# Patient Record
Sex: FEMALE | Race: WHITE | NOT HISPANIC OR LATINO | Employment: FULL TIME | URBAN - METROPOLITAN AREA
[De-identification: names, ages, dates, MRNs, and addresses within clinical notes are randomized per-mention and may not be internally consistent; named-entity substitution may affect disease eponyms.]

---

## 2018-05-30 ENCOUNTER — OFFICE VISIT (OUTPATIENT)
Dept: INTERNAL MEDICINE | Facility: CLINIC | Age: 43
End: 2018-05-30

## 2018-05-30 VITALS
OXYGEN SATURATION: 100 % | RESPIRATION RATE: 16 BRPM | WEIGHT: 158 LBS | HEART RATE: 68 BPM | DIASTOLIC BLOOD PRESSURE: 94 MMHG | BODY MASS INDEX: 28 KG/M2 | HEIGHT: 63 IN | TEMPERATURE: 97 F | SYSTOLIC BLOOD PRESSURE: 142 MMHG

## 2018-05-30 DIAGNOSIS — N95.1 PERIMENOPAUSAL SYMPTOMS: ICD-10-CM

## 2018-05-30 DIAGNOSIS — I10 ESSENTIAL HYPERTENSION: Primary | ICD-10-CM

## 2018-05-30 DIAGNOSIS — Z86.2 HISTORY OF ANEMIA: ICD-10-CM

## 2018-05-30 DIAGNOSIS — R53.83 FATIGUE, UNSPECIFIED TYPE: ICD-10-CM

## 2018-05-30 DIAGNOSIS — Z12.31 ENCOUNTER FOR SCREENING MAMMOGRAM FOR BREAST CANCER: ICD-10-CM

## 2018-05-30 LAB
ALBUMIN SERPL-MCNC: 4.6 G/DL (ref 3.2–4.8)
ALBUMIN/GLOB SERPL: 1.6 G/DL (ref 1.5–2.5)
ALP SERPL-CCNC: 138 U/L (ref 25–100)
ALT SERPL W P-5'-P-CCNC: 10 U/L (ref 7–40)
ANION GAP SERPL CALCULATED.3IONS-SCNC: 4 MMOL/L (ref 3–11)
AST SERPL-CCNC: 21 U/L (ref 0–33)
BASOPHILS # BLD AUTO: 0.06 10*3/MM3 (ref 0–0.2)
BASOPHILS NFR BLD AUTO: 0.8 % (ref 0–1)
BILIRUB SERPL-MCNC: 0.4 MG/DL (ref 0.3–1.2)
BUN BLD-MCNC: 12 MG/DL (ref 9–23)
BUN/CREAT SERPL: 15 (ref 7–25)
CALCIUM SPEC-SCNC: 9.1 MG/DL (ref 8.7–10.4)
CHLORIDE SERPL-SCNC: 106 MMOL/L (ref 99–109)
CO2 SERPL-SCNC: 29 MMOL/L (ref 20–31)
CREAT BLD-MCNC: 0.8 MG/DL (ref 0.6–1.3)
DEPRECATED RDW RBC AUTO: 47.8 FL (ref 37–54)
EOSINOPHIL # BLD AUTO: 0.12 10*3/MM3 (ref 0–0.3)
EOSINOPHIL NFR BLD AUTO: 1.7 % (ref 0–3)
ERYTHROCYTE [DISTWIDTH] IN BLOOD BY AUTOMATED COUNT: 15.9 % (ref 11.3–14.5)
GFR SERPL CREATININE-BSD FRML MDRD: 78 ML/MIN/1.73
GLOBULIN UR ELPH-MCNC: 2.9 GM/DL
GLUCOSE BLD-MCNC: 87 MG/DL (ref 70–100)
HCT VFR BLD AUTO: 38.2 % (ref 34.5–44)
HGB BLD-MCNC: 11.7 G/DL (ref 11.5–15.5)
IMM GRANULOCYTES # BLD: 0.02 10*3/MM3 (ref 0–0.03)
IMM GRANULOCYTES NFR BLD: 0.3 % (ref 0–0.6)
LYMPHOCYTES # BLD AUTO: 2.75 10*3/MM3 (ref 0.6–4.8)
LYMPHOCYTES NFR BLD AUTO: 38.6 % (ref 24–44)
MCH RBC QN AUTO: 24.8 PG (ref 27–31)
MCHC RBC AUTO-ENTMCNC: 30.6 G/DL (ref 32–36)
MCV RBC AUTO: 81.1 FL (ref 80–99)
MONOCYTES # BLD AUTO: 0.52 10*3/MM3 (ref 0–1)
MONOCYTES NFR BLD AUTO: 7.3 % (ref 0–12)
NEUTROPHILS # BLD AUTO: 3.66 10*3/MM3 (ref 1.5–8.3)
NEUTROPHILS NFR BLD AUTO: 51.3 % (ref 41–71)
PLATELET # BLD AUTO: 347 10*3/MM3 (ref 150–450)
PMV BLD AUTO: 10.3 FL (ref 6–12)
POTASSIUM BLD-SCNC: 5.2 MMOL/L (ref 3.5–5.5)
PROT SERPL-MCNC: 7.5 G/DL (ref 5.7–8.2)
RBC # BLD AUTO: 4.71 10*6/MM3 (ref 3.89–5.14)
SODIUM BLD-SCNC: 139 MMOL/L (ref 132–146)
TSH SERPL DL<=0.05 MIU/L-ACNC: 1.24 MIU/ML (ref 0.35–5.35)
WBC NRBC COR # BLD: 7.13 10*3/MM3 (ref 3.5–10.8)

## 2018-05-30 PROCEDURE — 85025 COMPLETE CBC W/AUTO DIFF WBC: CPT | Performed by: PHYSICIAN ASSISTANT

## 2018-05-30 PROCEDURE — 84443 ASSAY THYROID STIM HORMONE: CPT | Performed by: PHYSICIAN ASSISTANT

## 2018-05-30 PROCEDURE — 80053 COMPREHEN METABOLIC PANEL: CPT | Performed by: PHYSICIAN ASSISTANT

## 2018-05-30 PROCEDURE — 36415 COLL VENOUS BLD VENIPUNCTURE: CPT | Performed by: PHYSICIAN ASSISTANT

## 2018-05-30 PROCEDURE — 99204 OFFICE O/P NEW MOD 45 MIN: CPT | Performed by: PHYSICIAN ASSISTANT

## 2018-05-30 RX ORDER — LANSOPRAZOLE 30 MG/1
30 CAPSULE, DELAYED RELEASE ORAL DAILY
COMMUNITY

## 2018-05-30 RX ORDER — CETIRIZINE HYDROCHLORIDE 10 MG/1
10 TABLET ORAL DAILY
COMMUNITY

## 2018-05-30 RX ORDER — CLONIDINE HYDROCHLORIDE 0.2 MG/1
0.2 TABLET ORAL DAILY
Qty: 30 TABLET | Refills: 2 | Status: SHIPPED | OUTPATIENT
Start: 2018-05-30

## 2018-05-30 RX ORDER — BUPRENORPHINE HYDROCHLORIDE, NALOXONE HYDROCHLORIDE 2; .5 MG/1; MG/1
2 FILM, SOLUBLE BUCCAL; SUBLINGUAL DAILY
Refills: 0 | COMMUNITY
Start: 2018-05-18 | End: 2018-05-30

## 2018-05-30 RX ORDER — CLONIDINE HYDROCHLORIDE 0.1 MG/1
TABLET ORAL
Refills: 0 | COMMUNITY
Start: 2018-05-18 | End: 2018-05-30 | Stop reason: DRUGHIGH

## 2018-05-30 NOTE — PROGRESS NOTES
Chief Complaint   Patient presents with   • Establish Care     elevated blood pressure, 160's over 100s, has tried clonidine 0.1mg, no longer working       Subjective       History of Present Illness     Shazia Portillo is a 42 y.o. female. She presents as a new patient to establish care. Current concerns include blood pressure. Patient states that she has hx of high blood pressure but has not been taking medication consistently until about 2 weeks ago. Began clonidine 0.1mg on 5/18/2018 from outside [facility] but has seen no improvement in BP. Patient had previously been experiencing heart palpitations and this is why she sought tx for HTN this time, and her palpitations have resolved since beginning clonidine. She has taken this medication in the past and provided adequate control, but not recently. Patient is checking BP at least twice a day and running approx 142/100 in the AM and 160-170/100 in PM. Clonidine does make her feel better and no side effects. States it is also improving her anxiety a little. Patient has hx mild anxiety and does not take medication for this. Anxiety improved since beginning clonidine.   In the past, patient has taken Maxide, Atenolol, and lisinopril for HTN. Maxide during pregnancy and this worked well. Atenolol in the past and this worked well. Lisinopril in the past and developed ACE cough so d/c.     Patient has had irregular periods for last 18 months and believes she is xochilt-menopausal/ early menopause. States her mother began menopause around this age and was out of menopause by 48 y.o. Patient had period a couple weeks ago but had not had period since November before this, approx 6 months between most recent menses. She was previously experiencing hot flashes and night sweats when irregular menses first began, but these have resolved in the last several months and no longer experiencing other symptoms.     Are you currently seeing any other doctors or specialists? No     Are you  currently taking any OTC medications or herbal medications? Biotin, Unisom nightly     Sleep: 6-7 hours, somewhat fatigued but this is chronic   Diet: vegetarian, not very well balanced  Exercise: no regular exercise regimen but stays active    Most recent colonoscopy: , normal and no polyps    Most recent mammogram: n/a   Most recent pap smear: 2013, normal   First day of last menses: May 10, 2018    Regular dental visits: yes   Regular eye exams: yes, wears contacts    Patient is former smoker with 5 pack year history, approx 1 pack q3days with 15 year history. Quit date 2011.  No current EtOH use. No current illicit drug use. Finished suboxone tx 2 months ago for opioids.     The following portions of the patient's history were reviewed and updated as appropriate: allergies, current medications, past family history, past medical history, past social history, past surgical history and problem list.    Allergies   Allergen Reactions   • Keflex [Cephalexin] Anaphylaxis   • Lisinopril Cough     Social History   Substance Use Topics   • Smoking status: Former Smoker     Packs/day: 0.30     Years: 15.00   • Smokeless tobacco: Never Used   • Alcohol use No     Past Surgical History:   Procedure Laterality Date   •  SECTION     • CYST REMOVAL     • FRACTURE SURGERY      Back, pelvic   • NOSE SURGERY  2009    septum repair     Family History   Problem Relation Age of Onset   • Hypertension Mother    • Hypertension Father    • Hypertension Brother    • Hypertension Maternal Grandmother    • Hypertension Maternal Grandfather    • Hypertension Paternal Grandmother    • Hypertension Paternal Grandfather          Current Outpatient Prescriptions:   •  cetirizine (zyrTEC) 10 MG tablet, Take 10 mg by mouth Daily., Disp: , Rfl:   •  lansoprazole (PREVACID) 30 MG capsule, Take 30 mg by mouth Daily., Disp: , Rfl:   •  CloNIDine (CATAPRES) 0.2 MG tablet, Take 1 tablet by mouth Daily., Disp: 30  tablet, Rfl: 2    Patient Active Problem List   Diagnosis   • Essential hypertension       Review of Systems   Constitutional: Negative for chills, fatigue and fever.   HENT: Negative for congestion, ear pain and sore throat.    Eyes: Negative for pain.   Respiratory: Negative for cough, shortness of breath and wheezing.    Cardiovascular: Negative for chest pain, palpitations and leg swelling.   Gastrointestinal: Negative for abdominal pain, diarrhea, nausea and vomiting.   Genitourinary: Positive for menstrual problem. Negative for difficulty urinating, dysuria, hematuria and urgency.   Skin: Negative for rash.   Neurological: Negative for dizziness, weakness and light-headedness.   Hematological: Does not bruise/bleed easily.   Psychiatric/Behavioral: Negative for self-injury and depressed mood. The patient is not nervous/anxious.        Objective   Vitals:    05/30/18 1304   BP: 142/94   Pulse: 68   Resp: 16   Temp: 97 °F (36.1 °C)   SpO2: 100%     Physical Exam   Constitutional: She appears well-developed and well-nourished.   HENT:   Head: Normocephalic and atraumatic.   Right Ear: Tympanic membrane, external ear and ear canal normal.   Left Ear: Tympanic membrane, external ear and ear canal normal.   Nose: Nose normal.   Mouth/Throat: Oropharynx is clear and moist and mucous membranes are normal.   Eyes: Conjunctivae are normal. Pupils are equal, round, and reactive to light.   Neck: Normal range of motion. Neck supple. Carotid bruit is not present. No thyromegaly present.   Cardiovascular: Normal rate, regular rhythm, normal heart sounds and intact distal pulses.  Exam reveals no gallop and no friction rub.    No murmur heard.  No peripheral edema.   Pulmonary/Chest: Effort normal and breath sounds normal.   Abdominal: Soft. Bowel sounds are normal. She exhibits no distension and no mass. There is no hepatosplenomegaly. There is no tenderness.   Lymphadenopathy:     She has no cervical adenopathy.   Skin: No  rash noted.   Psychiatric: She has a normal mood and affect. Her behavior is normal.   Nursing note and vitals reviewed.        No results found for this or any previous visit.    Assessment/Plan   Shazia was seen today for establish care.    Diagnoses and all orders for this visit:    Essential hypertension  -     Comprehensive Metabolic Panel  -     CBC & Differential  -     CloNIDine (CATAPRES) 0.2 MG tablet; Take 1 tablet by mouth Daily.  -     CBC Auto Differential    History of anemia  -     Comprehensive Metabolic Panel  -     CBC & Differential  -     CBC Auto Differential    Fatigue, unspecified type  -     TSH  -     Comprehensive Metabolic Panel  -     CBC & Differential  -     CBC Auto Differential    Encounter for screening mammogram for breast cancer  -     Comprehensive Metabolic Panel  -     CBC & Differential  -     Mammo Screening Digital Tomosynthesis Bilateral With CAD; Future  -     CBC Auto Differential    Perimenopausal symptoms    Patient tolerating clonidine well and added benefit of improved anxiety, so will increase this to 0.2mg daily for improved HTN control. Discussed S/E including dizziness and hypotension.  Screening mammogram ordered.  Reassured patient concerning menopause symptoms. Will continue to monitor. No changes or further workup needed at this time.   Needs Pap and fasting lipids-- next visit with Annual Physical.            Return in about 4 weeks (around 6/27/2018) for Annual physical- with fasting labs and Pap.

## 2018-07-05 ENCOUNTER — TELEPHONE (OUTPATIENT)
Dept: INTERNAL MEDICINE | Facility: CLINIC | Age: 43
End: 2018-07-05

## 2018-07-09 ENCOUNTER — TELEPHONE (OUTPATIENT)
Dept: INTERNAL MEDICINE | Facility: CLINIC | Age: 43
End: 2018-07-09

## 2018-07-09 NOTE — TELEPHONE ENCOUNTER
OK to cancel.     ----- Message from Rosario Berman sent at 7/9/2018  9:42 AM EDT -----  Concerning mammogram, we have been unable to contact pt to schedule. She has not responded to multiple voicemails or letter.  Is this okay to cancel for now?

## 2020-08-16 ENCOUNTER — E-VISIT (OUTPATIENT)
Dept: FAMILY MEDICINE CLINIC | Facility: TELEHEALTH | Age: 45
End: 2020-08-16

## 2020-08-16 DIAGNOSIS — J01.41 ACUTE RECURRENT PANSINUSITIS: Primary | ICD-10-CM

## 2020-08-16 PROCEDURE — 99421 OL DIG E/M SVC 5-10 MIN: CPT | Performed by: NURSE PRACTITIONER

## 2020-08-16 RX ORDER — PREDNISONE 20 MG/1
20 TABLET ORAL 2 TIMES DAILY
Qty: 10 TABLET | Refills: 0 | Status: SHIPPED | OUTPATIENT
Start: 2020-08-16

## 2020-08-16 RX ORDER — DOXYCYCLINE 100 MG/1
100 CAPSULE ORAL 2 TIMES DAILY
Qty: 14 CAPSULE | Refills: 0 | Status: SHIPPED | OUTPATIENT
Start: 2020-08-16 | End: 2020-08-23

## 2020-08-16 NOTE — PROGRESS NOTES
Shazia Hopeper    1975  [id number]    I have reviewed the e-Visit questionnaire and patient's answers, my assessment and plan are as follows:    CC: sinus problems    SUBJECTIVE    HPI 45 yo female patient c/o yellow green nasal congestion, pain around her face and nose. She is also c/o ear pain and watery eyes. No fever. She has had these symptoms 5-7 days. She has tried decongestants, allergy medications and sinus rinses. She is not pregnant. Tnhe Alicia pot has worked for her in the past and antibiotics. The neti pot is not providing relief this time.. She reports a history of sinus infections and sinus surgery.    Review of Systems    Constitutional  No fever    HENT  Nasal congestion-green yellow discharge  Sinus pressure  Sinus pain  Np sore throat reported    Eyes  Watery eyes    Respiratory  No cough reported  No shortness of breath reported    Cardio  No chest pain reported     GI  No nausea reported  No vomiting reported  No diarrhea reported     OBJECTIVE   n/a evisit          Diagnoses and all orders for this visit:    Acute recurrent pansinusitis    Other orders  -     doxycycline (MONODOX) 100 MG capsule; Take 1 capsule by mouth 2 (Two) Times a Day for 7 days.  -     predniSONE (DELTASONE) 20 MG tablet; Take 1 tablet by mouth 2 (Two) Times a Day.    Lots of water with decongestant  Doxycyline on empty stomach with full glass of water  No multiple vitamins with minerals as minerals decrease absorption of this antibiotic  If no improvement in symptoms f//u with PCP/[facility] or Urgent Care      Any medications prescribed have been sent electronically to   [facility] - [place], [place] - [address] - [phone]  - [phone] [place] [address] [address]  [facility] [address]  Phone: [phone] Fax: [phone]        MAGUE Duque  08/16/20  6:35 PM